# Patient Record
Sex: MALE | Race: WHITE | Employment: FULL TIME | URBAN - METROPOLITAN AREA
[De-identification: names, ages, dates, MRNs, and addresses within clinical notes are randomized per-mention and may not be internally consistent; named-entity substitution may affect disease eponyms.]

---

## 2020-04-15 ENCOUNTER — HOSPITAL ENCOUNTER (EMERGENCY)
Age: 25
Discharge: HOME | End: 2020-04-15
Attending: EMERGENCY MEDICINE
Payer: COMMERCIAL

## 2020-04-15 VITALS
DIASTOLIC BLOOD PRESSURE: 80 MMHG | BODY MASS INDEX: 22.35 KG/M2 | OXYGEN SATURATION: 100 % | HEIGHT: 72 IN | RESPIRATION RATE: 16 BRPM | HEART RATE: 84 BPM | SYSTOLIC BLOOD PRESSURE: 134 MMHG | TEMPERATURE: 98.1 F | WEIGHT: 165 LBS

## 2020-04-15 PROCEDURE — 99282 EMERGENCY DEPT VISIT SF MDM: CPT

## 2020-04-15 PROCEDURE — 6370000000 HC RX 637 (ALT 250 FOR IP): Performed by: EMERGENCY MEDICINE

## 2020-04-15 RX ORDER — CEPHALEXIN 250 MG/1
500 CAPSULE ORAL ONCE
Status: COMPLETED | OUTPATIENT
Start: 2020-04-15 | End: 2020-04-15

## 2020-04-15 RX ORDER — CEPHALEXIN 500 MG/1
500 CAPSULE ORAL 3 TIMES DAILY
Qty: 21 CAPSULE | Refills: 0 | Status: SHIPPED | OUTPATIENT
Start: 2020-04-15 | End: 2020-04-22

## 2020-04-15 RX ADMIN — CEPHALEXIN 500 MG: 250 CAPSULE ORAL at 10:34

## 2020-04-15 SDOH — HEALTH STABILITY: MENTAL HEALTH: HOW OFTEN DO YOU HAVE A DRINK CONTAINING ALCOHOL?: NEVER

## 2020-04-15 ASSESSMENT — ENCOUNTER SYMPTOMS
WHEEZING: 0
SHORTNESS OF BREATH: 0
VOICE CHANGE: 0
TROUBLE SWALLOWING: 0

## 2020-04-15 NOTE — ED NOTES
Irrigated pt's wound on palm of left hand with 400mL of normal saline, antiseptic soap, and 2 packs of 4x4s. Dressed pt's wound with triple antibiotic ointment. A small non-adherent gauze and a 3in soft gauze roll. Pt's CMS intact before and after application. Pt instructed on care.       Mahi Alas  04/15/20 1038

## 2020-04-15 NOTE — ED PROVIDER NOTES
Conjunctiva/sclera: Conjunctivae normal.   Neck:      Vascular: No JVD. Trachea: No tracheal deviation. Cardiovascular:      Rate and Rhythm: Normal rate. Pulses: Normal pulses. Comments: 2+ radial pulses. Normal cap refill to all fingers of left hand. Pulmonary:      Effort: Pulmonary effort is normal. No respiratory distress. Musculoskeletal:         General: Signs of injury present. Comments: 0.5 cm laceration to the palmar surface of the left hand overlying the distal second metacarpal.  Mild amount of macerated skin. No deep puncture wound. No signs of acute infection. No foreign bodies on visual exam.  Hemostatic. Skin:     General: Skin is warm and dry. Neurological:      General: No focal deficit present. Mental Status: He is alert and oriented to person, place, and time. Comments: 5  strength equal bilaterally. Sensation intact to distal left second finger. EMERGENCY DEPARTMENT COURSE and DIFFERENTIAL DIAGNOSIS/MDM:   Vitals:    Vitals:    04/15/20 0923   BP: 134/80   Pulse: 84   Resp: 16   Temp: 98.1 °F (36.7 °C)   TempSrc: Oral   SpO2: 100%   Weight: 165 lb (74.8 kg)   Height: 6' (1.829 m)         MDM  Patient is afebrile, nontoxic-appearing, no acute distress. He is neurovascularly intact. Wound is not amenable to repair at this time however the dead skin is debrided, the wound is thoroughly cleaned, and clean dressing is placed. I recommended. Antibiotics for infection prophylaxis, once daily wound cleaning and dressing changes, and standard ER return precautions for any signs of infection, numbness, weakness, or other concerns. The patient expresses understanding and agreement with this plan and is discharged home. I do not suspect neurovascular compromise, emergent bacterial infection, or retained foreign body and therefore I feel this is a reasonable disposition. Procedures    FINAL IMPRESSION      1.  Laceration of left palm,